# Patient Record
Sex: MALE | Race: WHITE | NOT HISPANIC OR LATINO | Employment: UNEMPLOYED | URBAN - METROPOLITAN AREA
[De-identification: names, ages, dates, MRNs, and addresses within clinical notes are randomized per-mention and may not be internally consistent; named-entity substitution may affect disease eponyms.]

---

## 2018-07-11 ENCOUNTER — AMBULATORY - HEALTHEAST (OUTPATIENT)
Dept: PULMONOLOGY | Facility: OTHER | Age: 43
End: 2018-07-11

## 2018-07-11 DIAGNOSIS — J18.9 ATYPICAL PNEUMONIA: ICD-10-CM

## 2018-08-24 ENCOUNTER — COMMUNICATION - HEALTHEAST (OUTPATIENT)
Dept: TELEHEALTH | Facility: CLINIC | Age: 43
End: 2018-08-24

## 2018-08-24 ENCOUNTER — OFFICE VISIT - HEALTHEAST (OUTPATIENT)
Dept: PULMONOLOGY | Facility: OTHER | Age: 43
End: 2018-08-24

## 2018-08-24 ENCOUNTER — HOSPITAL ENCOUNTER (OUTPATIENT)
Dept: CT IMAGING | Facility: CLINIC | Age: 43
Discharge: HOME | End: 2018-08-24
Attending: INTERNAL MEDICINE

## 2018-08-24 DIAGNOSIS — R06.02 SOB (SHORTNESS OF BREATH): ICD-10-CM

## 2018-08-24 DIAGNOSIS — J18.9 ATYPICAL PNEUMONIA: ICD-10-CM

## 2018-08-24 DIAGNOSIS — Z72.0 TOBACCO ABUSE: ICD-10-CM

## 2018-08-24 DIAGNOSIS — J18.9 BILATERAL PNEUMONIA: ICD-10-CM

## 2018-08-24 DIAGNOSIS — R91.8 PULMONARY NODULES: ICD-10-CM

## 2018-10-29 ENCOUNTER — COMMUNICATION - HEALTHEAST (OUTPATIENT)
Dept: PULMONOLOGY | Facility: OTHER | Age: 43
End: 2018-10-29

## 2018-10-29 DIAGNOSIS — R05.9 COUGH: ICD-10-CM

## 2018-10-29 DIAGNOSIS — Z72.0 TOBACCO ABUSE: ICD-10-CM

## 2018-10-29 RX ORDER — VARENICLINE TARTRATE 1 MG/1
TABLET, FILM COATED ORAL
Qty: 60 TABLET | Refills: 2 | Status: SHIPPED | OUTPATIENT
Start: 2018-10-29

## 2019-11-04 ENCOUNTER — AMBULATORY - HEALTHEAST (OUTPATIENT)
Dept: PULMONOLOGY | Facility: OTHER | Age: 44
End: 2019-11-04

## 2019-11-04 DIAGNOSIS — R05.9 COUGH: ICD-10-CM

## 2019-11-04 RX ORDER — ALBUTEROL SULFATE 90 UG/1
2 AEROSOL, METERED RESPIRATORY (INHALATION) EVERY 6 HOURS PRN
Qty: 1 INHALER | Refills: 0 | Status: SHIPPED | OUTPATIENT
Start: 2019-11-04

## 2020-01-02 ENCOUNTER — RECORDS - HEALTHEAST (OUTPATIENT)
Dept: ADMINISTRATIVE | Facility: OTHER | Age: 45
End: 2020-01-02

## 2021-05-28 ENCOUNTER — RECORDS - HEALTHEAST (OUTPATIENT)
Dept: ADMINISTRATIVE | Facility: CLINIC | Age: 46
End: 2021-05-28

## 2021-06-01 VITALS — BODY MASS INDEX: 20.92 KG/M2 | WEIGHT: 150 LBS

## 2021-06-04 VITALS — BODY MASS INDEX: 23.71 KG/M2 | WEIGHT: 170 LBS

## 2021-06-16 PROBLEM — J18.9 ATYPICAL PNEUMONIA: Status: ACTIVE | Noted: 2018-07-02

## 2021-06-16 PROBLEM — J18.9 PNEUMONIA: Status: ACTIVE | Noted: 2018-07-01

## 2021-06-20 NOTE — PROGRESS NOTES
Pulmonary Follow Up Note  8/24/2018    Reason for Follow Up: Bilateral pneumonia.      Problem List:     Patient Active Problem List   Diagnosis     Colitis     Abdominal pain, unspecified abdominal location     Chronic pain syndrome     Gastroesophageal reflux disease without esophagitis     Ischemic colitis (H)     Pneumonia     Atypical pneumonia     Acute respiratory failure with hypoxemia (H)     Weight loss     History of colitis     Hyponatremia     Bronchiectasis with acute lower respiratory infection (H)       History:     Mr. Steven Pelaez is a 42 yo male with PMH significant for colitis, GERD, IBD, and chronic pain for which he takes methadone who I saw at  for an acute pneumonia.    - Initially was hypoxic but resolved within 24 hours  - Bilateral tree-in-bud opacities on CT Chest  - Leukocytosis  - Now resolved symptoms of cough, chest discomfort, and dyspnea  - CT chest shows resolved opacities  - notes he was working with damp wood and leaves the week before he was sick  - he is trying to quit smoking and using his SOs chantix  - Denies any times of passing out or black outs, and does not feel the methadone is too strong  - no evidence to suggest aspiration    Past Medical History:   Diagnosis Date     Chronic pain syndrome      Colitis      Colitis      GERD (gastroesophageal reflux disease)      IBD (inflammatory bowel disease)      Past Surgical History:   Procedure Laterality Date     KNEE SURGERY Left      SHOULDER SURGERY Right      SIGMOIDOSCOPY N/A 6/7/2016    Procedure: SIGMOIDOSCOPY with biopsy and polypectomy;  Surgeon: Jesus Stewart MD;  Location: [facility];  Service:      Social History     Social History     Marital status:      Spouse name: N/A     Number of children: N/A     Years of education: N/A     Occupational History     Not on file.     Social History Main Topics     Smoking status: Current Every Day Smoker     Packs/day: 1.00     Smokeless tobacco: Never Used       Comment: Wants to quit     Alcohol use No     Drug use: No     Sexual activity: Not on file     Other Topics Concern     Not on file     Social History Narrative     No family history of cardiopulmonary disease  No Known Allergies    Review of Systems - 10 point review of systems negative except what is mentioned in the HPI.      Medications:     Current Outpatient Prescriptions on File Prior to Visit   Medication Sig Dispense Refill     acetaminophen (TYLENOL) 325 MG tablet Take 650 mg by mouth every 6 (six) hours as needed for pain.       dextromethorphan-guaiFENesin (ROBITUSSIN-DM)  mg/5 mL liquid Take 5 mL by mouth every 4 (four) hours as needed.  0     ibuprofen (ADVIL,MOTRIN) 400 MG tablet Take 1 tablet (400 mg total) by mouth every 6 (six) hours as needed for pain (taken with food).  0     methadone (DOLOPHINE) 10 mg/mL solution Take 100 mg by mouth daily.       omeprazole (PRILOSEC) 20 MG capsule Take 20 mg by mouth daily before breakfast.       No current facility-administered medications on file prior to visit.        Exam/Data:   Vitals  Vitals:    08/24/18 1522   BP: 110/54   Pulse: 86   Resp: 18   SpO2: 97%       EXAM:  GEN: Alert and oriented x3, NAD, does appear mildly somnolent but alert and attentive during visit  HEENT: Nares patent, posterior oropharynx clear.  RESPIRATORY: CTAB.  No wheeze or rales  CARDIOVASCULAR: RRR, no m/r/g  GASTROINTESTINAL: Round, soft, NT/ND  HEM/ONC: no adenopathy, no ecchymosis  MSK: no clubbing.  Ambulates normally  NEURO: cranial nerves appear grossly intact, muscle strength equal  SKIN: no rash or ulcerations      DATA    IMAGING:   CT chest reviewed personally and with patient.  There is complete resolution with the exception of mild GGO in the superior segment of the RLL on CT.  No significant adenopathy.  Noted small pulmonary nodules.    CT CHEST WO CONTRAST  8/24/2018 1:34 PM     INDICATION: Bronchiectasis cough atypical pneumonia with  bronchiectasis.  TECHNIQUE: Routine chest. Dose reduction techniques were used.  IV CONTRAST: None.  COMPARISON: 7/2/2018.     FINDINGS:  LUNGS AND PLEURA: Interval resolution of scattered centrilobular and tree-in-bud nodules bilaterally. Essentially resolved prior airway thickening as well. Few stable small pulmonary nodules, including 5 mm left lower lobe (series 2, image 63) and 3 mm   anterior right lower lobe (image 56). Negative pleural spaces.     MEDIASTINUM: No adenopathy. Nonaneurysmal aorta.     LIMITED UPPER ABDOMEN: Negative.     MUSCULOSKELETAL: Subacute appearing, nondisplaced anterior-lateral right third rib fracture with callus. Old nondisplaced lateral left third and fourth nondisplaced rib deformities.     IMPRESSION:   CONCLUSION:     1.  Resolved prior infectious/inflammatory abnormality in the lungs. Improved airway thickening without significant bronchiectasis on the current exam. No new findings.     2.  Stable small pulmonary nodules which may be incidental. Follow-up guidelines below.       Assessment/Plan:   Steven Pelaez is a 43 y.o. male with recent hospitalization for CAP and hypoxic respiratory failure, now resolved, with history of methadone use, and now with small nodules that are persistent.    1. CAP with hypoxia:  This is resolved.  No further work up needed.  He is weaning his methadone.  Doesn't seem to be any evidence of aspiration.    2. Pulmonary Nodules:  Given his smoking history, I will follow these as higher risk with a repeat CT in 1 year.  Could also represent IBD related nodules.    3. Tobacco abuse:  He is trying to quit.  I did  him for > 5 minutes.  Smoking cessation packet given.  Rx for Chantix and NRT given.    Recommend:  Follow up in 1 year with CT chest      Hector Zelaya DO

## 2021-06-25 ENCOUNTER — HOSPITAL ENCOUNTER (EMERGENCY)
Dept: EMERGENCY MEDICINE | Facility: CLINIC | Age: 46
Discharge: HOME | End: 2021-06-26
Attending: EMERGENCY MEDICINE
Payer: COMMERCIAL

## 2021-06-25 DIAGNOSIS — K22.6 MALLORY-WEISS TEAR: ICD-10-CM

## 2021-06-25 DIAGNOSIS — K92.0 HEMATEMESIS WITH NAUSEA: ICD-10-CM

## 2021-06-25 ASSESSMENT — MIFFLIN-ST. JEOR: SCORE: 1746.28

## 2021-06-26 LAB
ALBUMIN SERPL-MCNC: 4.3 G/DL (ref 3.5–5)
ALP SERPL-CCNC: 86 U/L (ref 45–120)
ALT SERPL W P-5'-P-CCNC: 23 U/L (ref 0–45)
ANION GAP SERPL CALCULATED.3IONS-SCNC: 8 MMOL/L (ref 5–18)
APTT PPP: 31 SECONDS (ref 24–37)
AST SERPL W P-5'-P-CCNC: 21 U/L (ref 0–40)
BASOPHILS # BLD AUTO: 0 THOU/UL (ref 0–0.2)
BASOPHILS NFR BLD AUTO: 0 % (ref 0–2)
BILIRUB SERPL-MCNC: 0.5 MG/DL (ref 0–1)
BUN SERPL-MCNC: 14 MG/DL (ref 8–22)
CALCIUM SERPL-MCNC: 9.3 MG/DL (ref 8.5–10.5)
CHLORIDE BLD-SCNC: 100 MMOL/L (ref 98–107)
CO2 SERPL-SCNC: 29 MMOL/L (ref 22–31)
CREAT SERPL-MCNC: 0.81 MG/DL (ref 0.7–1.3)
EOSINOPHIL # BLD AUTO: 0.2 THOU/UL (ref 0–0.4)
EOSINOPHIL NFR BLD AUTO: 1 % (ref 0–6)
ERYTHROCYTE [DISTWIDTH] IN BLOOD BY AUTOMATED COUNT: 13.5 % (ref 11–14.5)
GFR SERPL CREATININE-BSD FRML MDRD: >60 ML/MIN/1.73M2
GLUCOSE BLD-MCNC: 116 MG/DL (ref 70–125)
HCT VFR BLD AUTO: 43.2 % (ref 40–54)
HGB BLD-MCNC: 14.4 G/DL (ref 14–18)
IMM GRANULOCYTES # BLD: 0.1 THOU/UL
IMM GRANULOCYTES NFR BLD: 1 %
INR PPP: 1.05 (ref 0.9–1.1)
LACTATE SERPL-SCNC: 1.5 MMOL/L (ref 0.7–2)
LIPASE SERPL-CCNC: 31 U/L (ref 0–52)
LYMPHOCYTES # BLD AUTO: 3.4 THOU/UL (ref 0.8–4.4)
LYMPHOCYTES NFR BLD AUTO: 25 % (ref 20–40)
MCH RBC QN AUTO: 29.6 PG (ref 27–34)
MCHC RBC AUTO-ENTMCNC: 33.3 G/DL (ref 32–36)
MCV RBC AUTO: 89 FL (ref 80–100)
MONOCYTES # BLD AUTO: 0.9 THOU/UL (ref 0–0.9)
MONOCYTES NFR BLD AUTO: 7 % (ref 2–10)
NEUTROPHILS # BLD AUTO: 9 THOU/UL (ref 2–7.7)
NEUTROPHILS NFR BLD AUTO: 66 % (ref 50–70)
PLATELET # BLD AUTO: 331 THOU/UL (ref 140–440)
PMV BLD AUTO: 10.3 FL (ref 8.5–12.5)
POTASSIUM BLD-SCNC: 3.8 MMOL/L (ref 3.5–5)
PROT SERPL-MCNC: 7.6 G/DL (ref 6–8)
RBC # BLD AUTO: 4.86 MILL/UL (ref 4.4–6.2)
SODIUM SERPL-SCNC: 137 MMOL/L (ref 136–145)
WBC: 13.7 THOU/UL (ref 4–11)

## 2021-07-06 VITALS — HEIGHT: 71 IN | BODY MASS INDEX: 25.9 KG/M2 | WEIGHT: 185 LBS

## 2021-07-07 NOTE — ED PROVIDER NOTES
EMERGENCY DEPARTMENT ENCOUNTER      NAME: Steven Pelaez  AGE: 46 y.o. male  YOB: 1975  MRN: [id number]  EVALUATION DATE & TIME: 6/25/2021 11:34 PM    PCP: Eusebia Sharpe MD    ED PROVIDER: Javid Quiñonez M.D.      Chief Complaint   Patient presents with     Hematemesis     Abdominal Pain         FINAL IMPRESSION:  1. Lelia-Soto tear    2. Hematemesis with nausea          ED COURSE & MEDICAL DECISION MAKING:    Pertinent Labs & Imaging studies reviewed. (See chart for details)  PPE: surgical mask, eye protection  11:45 PM I met with the patient for the initial interview and physical examination. Discussed plan for treatment and workup in the ED.     46 y.o. male presents to the Emergency Department for evaluation of vomiting blood.  Patient has known history of Lelia-Soto tear.  I think this is again the cause.  Did do a CT scan.  There is no signs of esophageal perforation or Boerhaave syndrome.  White count mildly elevated.  Hemoglobin is normal.  Labs unremarkable including coags and LFTs.  Lipase is normal.  Lactic acid is normal.  Given IV fluids and IV Protonix.  Patient feeling better.  Also received IV droperidol with improvement of his vomiting.  Will discharge home with attenuation of his omeprazole.  Will follow up with GI.  Will schedule appointment as soon as possible.  Return immediately for any worsening symptoms.  At this time I do not think he has significant GI bleeding.  I do not think he needs to be admitted for repeat EGD.  Patient does understand that if he has worsening vomiting bleeding or other concerns he needs to return immediately.  Patient vitally stable here.  Discharge home.    At the conclusion of the encounter I discussed the results of all of the tests and the disposition. The questions were answered. The patient or family acknowledged understanding and was agreeable with the care plan.         MEDICATIONS GIVEN IN THE EMERGENCY:  Medications   sodium  chloride 0.9% 1,000 mL (0 mL Intravenous Stopped 6/26/21 0132)   droperidoL injection 2.5 mg (INAPSINE) (2.5 mg Intravenous Given 6/26/21 0007)   HYDROmorphone injection 0.5 mg (DILAUDID) (0.5 mg Intravenous Given 6/26/21 0007)   pantoprazole 40 mg injection (40 mg Intravenous Given 6/26/21 0008)   iopamidol solution 100 mL (ISOVUE-370) (100 mL Intravenous Given 6/26/21 0046)       NEW PRESCRIPTIONS STARTED AT TODAY'S ER VISIT  Discharge Medication List as of 6/26/2021  1:34 AM      CONTINUE these medications which have NOT CHANGED    Details   acetaminophen (TYLENOL) 325 MG tablet Take 650 mg by mouth every 6 (six) hours as needed for pain., Until Discontinued, Historical Med      albuterol (PROAIR HFA;PROVENTIL HFA;VENTOLIN HFA) 90 mcg/actuation inhaler Inhale 2 puffs every 6 (six) hours as needed for wheezing or shortness of breath. OFFICE VISIT NEEDED FOR ANY FURTHER REFILLS, Starting Mon 11/4/2019, Normal      dicyclomine (BENTYL) 20 mg tablet Take 1 tablet (20 mg total) by mouth 2 (two) times a day as needed., Starting Tue 3/10/2020, Normal      ibuprofen (ADVIL,MOTRIN) 400 MG tablet Take 1 tablet (400 mg total) by mouth every 6 (six) hours as needed for pain (taken with food)., Starting 7/4/2018, Until Discontinued, No Print      methadone (DOLOPHINE) 10 mg/mL solution Take 90 mg by mouth daily. , Until Discontinued, Historical Med      nebulizers Misc Please dispense one machine and associated equipment, Print      nicotine polacrilex (NICORETTE) 4 MG gum Apply 1 each (4 mg total) to the mouth or throat as needed for smoking cessation., Starting 8/24/2018, Until Discontinued, OTC      omeprazole (PRILOSEC) 20 MG capsule Take 20 mg by mouth daily before breakfast., Until Discontinued, Historical Med      oxyCODONE (ROXICODONE) 5 MG immediate release tablet Take 1 tablet (5 mg total) by mouth every 6 (six) hours as needed for pain., Starting Sun 1/5/2020, Print      varenicline (CHANTIX) 1 mg tablet Take 1 tab  by mouth two times a day. Take after eating with a full glass of water. NOTE: Dispense as maintenance for refills only., Normal                =================================================================    HPI    Patient information was obtained from: patient     Use of : N/A         Steven Pelaez is a 46 y.o. male with a pertinent history of HTN, hyperlipidemia, GERD, ulcerative colitis, IBD who presents to this ED for evaluation of hematemesis.    The patient reports he had a procedure for a hiatal hernia and esophageal tear on 6/18 (7 days ago). Since then, he has not had issues with vomiting or abdominal pain but his morning he developed stabbing abdominal pain and vomiting with intermittent hematemesis that worsened tonight. He is also having bloody bowel movements but they are not diarrheal. The patient has been taking Zofran with no relief. He is on sucrate and omeprazole but did not retain them today. He does not have an appointment scheduled with GI soon, but when he called them today they called back and told him to come to the ED. No fevers, chest pain, or other medical complaints.       REVIEW OF SYSTEMS   Review of Systems   Constitutional: Negative for fever.   Cardiovascular: Negative for chest pain.   Gastrointestinal: Positive for abdominal pain, blood in stool, nausea and vomiting (+ hematemesis). Negative for diarrhea.   All other systems reviewed and are negative.       PAST MEDICAL HISTORY:  Past Medical History:   Diagnosis Date     Chronic pain syndrome      Colitis      Colitis      GERD (gastroesophageal reflux disease)      IBD (inflammatory bowel disease)        PAST SURGICAL HISTORY:  Past Surgical History:   Procedure Laterality Date     KNEE SURGERY Left      SHOULDER SURGERY Right      SIGMOIDOSCOPY N/A 6/7/2016    Procedure: SIGMOIDOSCOPY with biopsy and polypectomy;  Surgeon: Jesus Stewart MD;  Location: [facility];  Service:            CURRENT MEDICATIONS:     No current facility-administered medications on file prior to encounter.      Current Outpatient Medications on File Prior to Encounter   Medication Sig     acetaminophen (TYLENOL) 325 MG tablet Take 650 mg by mouth every 6 (six) hours as needed for pain.     albuterol (PROAIR HFA;PROVENTIL HFA;VENTOLIN HFA) 90 mcg/actuation inhaler Inhale 2 puffs every 6 (six) hours as needed for wheezing or shortness of breath. OFFICE VISIT NEEDED FOR ANY FURTHER REFILLS     dicyclomine (BENTYL) 20 mg tablet Take 1 tablet (20 mg total) by mouth 2 (two) times a day as needed.     ibuprofen (ADVIL,MOTRIN) 400 MG tablet Take 1 tablet (400 mg total) by mouth every 6 (six) hours as needed for pain (taken with food).     methadone (DOLOPHINE) 10 mg/mL solution Take 90 mg by mouth daily.      nebulizers Misc Please dispense one machine and associated equipment     nicotine polacrilex (NICORETTE) 4 MG gum Apply 1 each (4 mg total) to the mouth or throat as needed for smoking cessation.     omeprazole (PRILOSEC) 20 MG capsule Take 20 mg by mouth daily before breakfast.     oxyCODONE (ROXICODONE) 5 MG immediate release tablet Take 1 tablet (5 mg total) by mouth every 6 (six) hours as needed for pain.     varenicline (CHANTIX) 1 mg tablet Take 1 tab by mouth two times a day. Take after eating with a full glass of water. NOTE: Dispense as maintenance for refills only.       ALLERGIES:  No Known Allergies    FAMILY HISTORY:  No family history on file.    SOCIAL HISTORY:   Social History     Socioeconomic History     Marital status: Single     Spouse name: Not on file     Number of children: Not on file     Years of education: Not on file     Highest education level: Not on file   Occupational History     Not on file   Social Needs     Financial resource strain: Not on file     Food insecurity     Worry: Not on file     Inability: Not on file     Transportation needs     Medical: Not on file     Non-medical: Not on file   Tobacco Use      "Smoking status: Current Every Day Smoker     Packs/day: 1.00     Smokeless tobacco: Never Used     Tobacco comment: Wants to quit   Substance and Sexual Activity     Alcohol use: No     Drug use: No     Sexual activity: Not on file   Lifestyle     Physical activity     Days per week: Not on file     Minutes per session: Not on file     Stress: Not on file   Relationships     Social connections     Talks on phone: Not on file     Gets together: Not on file     Attends Evangelical service: Not on file     Active member of club or organization: Not on file     Attends meetings of clubs or organizations: Not on file     Relationship status: Not on file     Intimate partner violence     Fear of current or ex partner: Not on file     Emotionally abused: Not on file     Physically abused: Not on file     Forced sexual activity: Not on file   Other Topics Concern     Not on file   Social History Narrative     Not on file       VITALS:  Patient Vitals for the past 24 hrs:   BP Temp Temp src Pulse Resp SpO2 Height Weight   06/26/21 0129 -- -- -- 83 -- 98 % -- --   06/26/21 0115 -- -- -- 82 -- 96 % -- --   06/26/21 0100 -- -- -- 83 -- 96 % -- --   06/26/21 0051 -- -- -- 83 -- 98 % -- --   06/26/21 0038 -- -- -- 86 -- 95 % -- --   06/26/21 0030 -- -- -- 81 -- 92 % -- --   06/26/21 0016 -- -- -- 88 -- 94 % -- --   06/25/21 2331 118/85 97.9  F (36.6  C) Oral 89 20 95 % 5' 11\" (1.803 m) 185 lb (83.9 kg)       PHYSICAL EXAM    Constitutional:  Well developed, well nourished, no acute distress   EYES: Conjunctivae clear  HENT:  Atraumatic, normocephalic Bilateral external ears normal, nose normal, oropharynx moist. Neck- supple   Respiratory:  No respiratory distress, normal breath sounds, no rales, no wheezing, no cough, no stridor   Cardiovascular:  Normal rate, normal rhythm, no murmurs, capillary refill normal.  No chest wall tenderness  GI:  Soft, nondistended, nontender, no palpable masses, no rebound, no guarding   : No CVA " tenderness.    Musculoskeletal:  No edema.  No cyanosis.  Range of motion major extremities intact. No tenderness to palpation or major deformities noted.    Integument: Warm, Dry, No erythema, No rash.   Neurologic:  Alert & oriented, no focal deficits noted, ambulatory  Psych: Affect normal, Mood normal.       LAB:  All pertinent labs reviewed and interpreted.  Results for orders placed or performed during the hospital encounter of 06/25/21   Comprehensive Metabolic Panel   Result Value Ref Range    Sodium 137 136 - 145 mmol/L    Potassium 3.8 3.5 - 5.0 mmol/L    Chloride 100 98 - 107 mmol/L    CO2 29 22 - 31 mmol/L    Anion Gap, Calculation 8 5 - 18 mmol/L    Glucose 116 70 - 125 mg/dL    BUN 14 8 - 22 mg/dL    Creatinine 0.81 0.70 - 1.30 mg/dL    GFR MDRD Af Amer >60 >60 mL/min/1.73m2    GFR MDRD Non Af Amer >60 >60 mL/min/1.73m2    Bilirubin, Total 0.5 0.0 - 1.0 mg/dL    Calcium 9.3 8.5 - 10.5 mg/dL    Protein, Total 7.6 6.0 - 8.0 g/dL    Albumin 4.3 3.5 - 5.0 g/dL    Alkaline Phosphatase 86 45 - 120 U/L    AST 21 0 - 40 U/L    ALT 23 0 - 45 U/L   Lactic Acid   Result Value Ref Range    Lactic Acid 1.5 0.7 - 2.0 mmol/L   Lipase   Result Value Ref Range    Lipase 31 0 - 52 U/L   INR   Result Value Ref Range    INR 1.05 0.90 - 1.10   APTT(PTT)   Result Value Ref Range    PTT 31 24 - 37 seconds   HM1 (CBC with Diff)   Result Value Ref Range    WBC 13.7 (H) 4.0 - 11.0 thou/uL    RBC 4.86 4.40 - 6.20 mill/uL    Hemoglobin 14.4 14.0 - 18.0 g/dL    Hematocrit 43.2 40.0 - 54.0 %    MCV 89 80 - 100 fL    MCH 29.6 27.0 - 34.0 pg    MCHC 33.3 32.0 - 36.0 g/dL    RDW 13.5 11.0 - 14.5 %    Platelets 331 140 - 440 thou/uL    MPV 10.3 8.5 - 12.5 fL    Neutrophils % 66 50 - 70 %    Lymphocytes % 25 20 - 40 %    Monocytes % 7 2 - 10 %    Eosinophils % 1 0 - 6 %    Basophils % 0 0 - 2 %    Immature Granulocyte % 1 (H) <=0 %    Neutrophils Absolute 9.0 (H) 2.0 - 7.7 thou/uL    Lymphocytes Absolute 3.4 0.8 - 4.4 thou/uL     Monocytes Absolute 0.9 0.0 - 0.9 thou/uL    Eosinophils Absolute 0.2 0.0 - 0.4 thou/uL    Basophils Absolute 0.0 0.0 - 0.2 thou/uL    Immature Granulocyte Absolute 0.1 (H) <=0.0 thou/uL       RADIOLOGY:  Reviewed all pertinent imaging. Please see official radiology report.  Ct Abdomen Pelvis Without Oral With Iv Contrast    Result Date: 6/26/2021  EXAM: CT ABDOMEN PELVIS WO ORAL W IV CONTRAST LOCATION: [facility] DATE/TIME: 6/26/2021 12:54 AM INDICATION: Abdominal pain, acute, mid to upper abd pain, vomiting blood.  H/O Lelia-Soto tear and hiatal hernia.  Recent EGD. COMPARISON: 09/15/2013, chest 09/05/2018, lumbar spine 07/02/2018 TECHNIQUE: CT scan of the abdomen and pelvis was performed following injection of IV contrast. Multiplanar reformats were obtained. Dose reduction techniques were used. CONTRAST: Iopamidol (Isovue-370) 100mL FINDINGS: LOWER CHEST: Mild air trapping often associated with small vessel or small airways disease. Mild fibroatelectasis without focal infiltrate or effusion. HEPATOBILIARY: Normal. PANCREAS: Normal. SPLEEN: Normal. ADRENAL GLANDS: Normal. KIDNEYS/BLADDER: Diffuse mild thickening of the urinary bladder could be artifact from incomplete distention. BOWEL: Normal appendix. No bowel obstruction or acute inflammatory process involving the GI tract. LYMPH NODES: Normal. VASCULATURE: Unremarkable. PELVIC ORGANS: Normal. MUSCULOSKELETAL: Small fat-containing paraumbilical hernia. Degenerative disease. Old compression fractures L1 and L5 vertebral bodies.     1.  Mild air trapping often associated with small vessel or small airways disease. No focal infiltrate or effusion. 2.  Small fat-containing paraumbilical hernia without bowel obstruction. 3.  Mild thickening of the urinary bladder could be artifact from incomplete distention, correlate with urinalysis. 4.  No finding to account for patient's symptoms.        Meseret BEARD, am serving as a scribe to  document services personally performed by Dr. Javid Quiñonez based on my observation and the provider's statements to me. I, Javid Quiñonez MD attest that Meseret Cleaning is acting in a scribe capacity, has observed my performance of the services and has documented them in accordance with my direction.    Javid Quiñonez M.D.  Emergency Medicine  [facility] EMERGENCY ROOM  [address] [address]  Dept: [phone]  Loc: [phone]     Javid Quiñonez MD  06/26/21 0512

## 2021-07-07 NOTE — ED TRIAGE NOTES
Pt to the ED with family with a c/o mid abd pain worsening for the last 6 hours and vomiting blood since about 0700. Pt had procedure for hiatal hernia and tear in his esophagus a week ago Thursday with [facility]. Pt has hx of chrone. Pt is alert, orient and appropriate. His skin is warm and dry. He moves well on his own power.

## 2021-11-14 ENCOUNTER — APPOINTMENT (OUTPATIENT)
Dept: RADIOLOGY | Facility: CLINIC | Age: 46
End: 2021-11-14
Attending: EMERGENCY MEDICINE
Payer: COMMERCIAL

## 2021-11-14 ENCOUNTER — HOSPITAL ENCOUNTER (EMERGENCY)
Facility: CLINIC | Age: 46
Discharge: HOME | End: 2021-11-15
Attending: EMERGENCY MEDICINE | Admitting: EMERGENCY MEDICINE
Payer: COMMERCIAL

## 2021-11-14 VITALS
BODY MASS INDEX: 27.2 KG/M2 | OXYGEN SATURATION: 97 % | TEMPERATURE: 97.7 F | HEART RATE: 95 BPM | HEIGHT: 70 IN | SYSTOLIC BLOOD PRESSURE: 139 MMHG | DIASTOLIC BLOOD PRESSURE: 90 MMHG | RESPIRATION RATE: 18 BRPM | WEIGHT: 190 LBS

## 2021-11-14 DIAGNOSIS — J40 BRONCHITIS: ICD-10-CM

## 2021-11-14 PROCEDURE — C9803 HOPD COVID-19 SPEC COLLECT: HCPCS

## 2021-11-14 PROCEDURE — 99284 EMERGENCY DEPT VISIT MOD MDM: CPT | Mod: 25

## 2021-11-14 PROCEDURE — 94640 AIRWAY INHALATION TREATMENT: CPT

## 2021-11-14 PROCEDURE — 71045 X-RAY EXAM CHEST 1 VIEW: CPT

## 2021-11-14 PROCEDURE — 250N000012 HC RX MED GY IP 250 OP 636 PS 637: Performed by: EMERGENCY MEDICINE

## 2021-11-14 PROCEDURE — 250N000009 HC RX 250: Performed by: EMERGENCY MEDICINE

## 2021-11-14 PROCEDURE — 87636 SARSCOV2 & INF A&B AMP PRB: CPT | Performed by: EMERGENCY MEDICINE

## 2021-11-14 RX ORDER — PREDNISONE 20 MG/1
TABLET ORAL
Qty: 8 TABLET | Refills: 0 | Status: SHIPPED | OUTPATIENT
Start: 2021-11-15 | End: 2021-11-18

## 2021-11-14 RX ORDER — AZITHROMYCIN 250 MG/1
TABLET, FILM COATED ORAL
Qty: 6 TABLET | Refills: 0 | Status: SHIPPED | OUTPATIENT
Start: 2021-11-14 | End: 2021-11-19

## 2021-11-14 RX ORDER — PREDNISONE 20 MG/1
40 TABLET ORAL ONCE
Status: COMPLETED | OUTPATIENT
Start: 2021-11-14 | End: 2021-11-14

## 2021-11-14 RX ORDER — BENZONATATE 200 MG/1
200 CAPSULE ORAL 3 TIMES DAILY PRN
Qty: 21 CAPSULE | Refills: 0 | Status: SHIPPED | OUTPATIENT
Start: 2021-11-14

## 2021-11-14 RX ORDER — IPRATROPIUM BROMIDE AND ALBUTEROL SULFATE 2.5; .5 MG/3ML; MG/3ML
3 SOLUTION RESPIRATORY (INHALATION) ONCE
Status: COMPLETED | OUTPATIENT
Start: 2021-11-14 | End: 2021-11-14

## 2021-11-14 RX ORDER — ALBUTEROL SULFATE 90 UG/1
2 AEROSOL, METERED RESPIRATORY (INHALATION) EVERY 6 HOURS PRN
Qty: 18 G | Refills: 0 | Status: SHIPPED | OUTPATIENT
Start: 2021-11-14

## 2021-11-14 RX ADMIN — IPRATROPIUM BROMIDE AND ALBUTEROL SULFATE 3 ML: .5; 2.5 SOLUTION RESPIRATORY (INHALATION) at 23:55

## 2021-11-14 RX ADMIN — PREDNISONE 40 MG: 20 TABLET ORAL at 23:02

## 2021-11-14 ASSESSMENT — MIFFLIN-ST. JEOR: SCORE: 1753.08

## 2021-11-14 ASSESSMENT — ENCOUNTER SYMPTOMS
CHILLS: 1
FEVER: 0
NAUSEA: 1
MYALGIAS: 1
DIARRHEA: 1
COUGH: 1

## 2021-11-15 LAB
FLUAV RNA SPEC QL NAA+PROBE: NEGATIVE
FLUBV RNA RESP QL NAA+PROBE: NEGATIVE
SARS-COV-2 RNA RESP QL NAA+PROBE: NEGATIVE

## 2021-11-15 NOTE — ED TRIAGE NOTES
Productive Cough, tired, body aches for 2-3 days  Had same symptoms a few weeks ago that was treated and cleared with amoxicillin but symptoms came back.  Sats 98% on room air, no respiratory distress in triage.  Has not been tested for COVID.

## 2021-11-15 NOTE — ED PROVIDER NOTES
EMERGENCY DEPARTMENT ENCOUNTER      NAME: Steven Pelaez  AGE: 46 year old male  YOB: 1975  MRN: [id number]  EVALUATION DATE & TIME: 11/14/2021 10:31 PM    PCP: Eusebia Sharpe    ED PROVIDER: Kristin Field M.D.      Chief Complaint   Patient presents with     Cough         FINAL IMPRESSION:  1. Bronchitis        MEDICAL DECISION MAKING:    Pertinent Labs & Imaging studies reviewed. (See chart for details)  ED Course as of 11/15/21 0022   Sun Nov 14, 2021   2251 Afebrile.  Vital signs here unremarkable.  Saturating 97% on room air without difficulty.  Patient is coming in with just worsening cough.   2256 Has been sick with sinus infection over the last several weeks, worsening symptoms over the last several days.  He is fully vaccinated.  Primarily concerned about the cough that he is having.  He is a current every day smoker.  Reports cough productive of yellow-green sputum.  Some shortness of breath associated with it.  He states he has no history of asthma but he has a nebulizer machine at home but he has not been using it.  Has not yet had the flu vaccine.   2256 Physical exam for patient here without any acute cardiopulmonary distress.  Lung sounds coarse bilaterally in all lung fields with crackles appreciated at bilateral bases.  He has wet sounding cough.  Posterior oropharynx is mildly erythematous.  He does have some cervical lymphadenopathy.  Otherwise his exam is unremarkable.Could be consistent with possible pneumonia versus a viral issue.  Also could be bronchitis given his history of smoking.  We will give him a DuoNeb here as he does sound kind of coarse and he does have some slight wheezing at the apices.  We will give him some dose of steroids, swab him for both Covid and influenza, chest x-ray.         Patient's breathing is better after his DuoNeb here.  Will discharge with medications, concern here is for bronchitis with wheezing.  Will start antibiotics, will call if  influenza or Covid is positive.      Critical care: 0 minutes excluding separately billable procedures.  Includes bedside management, time reviewing test results, review of records, discussing the case with staff, documenting the medical record and time spent with family members (or surrogate decision makers) discussing specific treatment issues.          ED COURSE:      10:36 PM I met with the patient for the initial interview and physical examination. Discussed plan for treatment and workup in the ED.    11:56 PM I rechecked and updated the patient about his labs and imaging.  12:10 AM I discussed the plan for discharge with the patient, and patient is agreeable. We discussed supportive cares at home and reasons for return to the ER including new or worsening symptoms - all questions and concerns addressed. Patient to be discharged by RN.   The importance of close follow up was discussed. We reviewed warning signs and symptoms, and I instructed Mr. Pelaez to return to the emergency department immediately if he develops any new or worsening symptoms. I provided additional verbal discharge instructions. Mr. Pelaez expressed understanding and agreement with this plan of care, his questions were answered, and he was discharged in stable condition.     MEDICATIONS GIVEN IN THE EMERGENCY:  Medications   ipratropium - albuterol 0.5 mg/2.5 mg/3 mL (DUONEB) neb solution 3 mL (3 mLs Nebulization Given 11/14/21 1765)   predniSONE (DELTASONE) tablet 40 mg (40 mg Oral Given 11/14/21 2302)       NEW PRESCRIPTIONS STARTED AT TODAY'S ER VISIT:  New Prescriptions    ALBUTEROL (PROAIR HFA/PROVENTIL HFA/VENTOLIN HFA) 108 (90 BASE) MCG/ACT INHALER    Inhale 2 puffs into the lungs every 6 hours as needed for shortness of breath / dyspnea or wheezing    AZITHROMYCIN (ZITHROMAX Z-TARIK) 250 MG TABLET    Two tablets on the first day, then one tablet daily for the next 4 days    BENZONATATE (TESSALON) 200 MG CAPSULE    Take 1 capsule (200  mg) by mouth 3 times daily as needed for cough    PREDNISONE (DELTASONE) 20 MG TABLET    Take two tablets (= 40mg) each day for 5 (five) days          =================================================================    HPI    Patient information was obtained from: The patient    Use of : N/A       Steven Pelaez is a 46 year old male with a pertinent medical history of colitis, and hypertension who presents to the ED via walk-in for evaluation of cough.    The patient reports that for the past 2-3 weeks he has been sick with symptoms that he describes as similar to a sinus infection. This includes body aches, chills, nausea, diarrhea, and a productive cough (with yellow mucus) which all have been progressively worsening over the last 3 days. The patient is fully vaccinated for COVID-19 with Pfizer in April and May of 2021. The patient has a history of Chron's Disease but does not take immunosuppressants. The patient denies lost of taste and smell, fever, and any other symptoms or complaints at this time.      ScHx: the patient admits to smoking tobacco.      REVIEW OF SYSTEMS   Review of Systems   Constitutional: Positive for chills. Negative for fever.   HENT:        Negative for lost of taste and smell    Respiratory: Positive for cough (productive).    Gastrointestinal: Positive for diarrhea and nausea.   Musculoskeletal: Positive for myalgias (body aches).   All other systems reviewed and are negative.        PAST MEDICAL HISTORY:  Past Medical History:   Diagnosis Date     Chronic pain syndrome      Colitis      Colitis      GERD (gastroesophageal reflux disease)      IBD (inflammatory bowel disease)        PAST SURGICAL HISTORY:  Past Surgical History:   Procedure Laterality Date     KNEE SURGERY Left      SHOULDER SURGERY Right      SIGMOIDOSCOPY FLEXIBLE N/A 6/7/2016    Procedure: SIGMOIDOSCOPY with biopsy and polypectomy;  Surgeon: Jesus Stewart MD;  Location: [facility];  Service:         CURRENT MEDICATIONS:    No current facility-administered medications for this encounter.    Current Outpatient Medications:      albuterol (PROAIR HFA/PROVENTIL HFA/VENTOLIN HFA) 108 (90 Base) MCG/ACT inhaler, Inhale 2 puffs into the lungs every 6 hours as needed for shortness of breath / dyspnea or wheezing, Disp: 18 g, Rfl: 0     azithromycin (ZITHROMAX Z-TARIK) 250 MG tablet, Two tablets on the first day, then one tablet daily for the next 4 days, Disp: 6 tablet, Rfl: 0     benzonatate (TESSALON) 200 MG capsule, Take 1 capsule (200 mg) by mouth 3 times daily as needed for cough, Disp: 21 capsule, Rfl: 0     predniSONE (DELTASONE) 20 MG tablet, Take two tablets (= 40mg) each day for 5 (five) days, Disp: 8 tablet, Rfl: 0     acetaminophen (TYLENOL) 325 MG tablet, [ACETAMINOPHEN (TYLENOL) 325 MG TABLET] Take 650 mg by mouth every 6 (six) hours as needed for pain., Disp: , Rfl:      albuterol (PROAIR HFA;PROVENTIL HFA;VENTOLIN HFA) 90 mcg/actuation inhaler, [ALBUTEROL (PROAIR HFA;PROVENTIL HFA;VENTOLIN HFA) 90 MCG/ACTUATION INHALER] Inhale 2 puffs every 6 (six) hours as needed for wheezing or shortness of breath. OFFICE VISIT NEEDED FOR ANY FURTHER REFILLS, Disp: 1 Inhaler, Rfl: 0     dicyclomine (BENTYL) 20 mg tablet, [DICYCLOMINE (BENTYL) 20 MG TABLET] Take 1 tablet (20 mg total) by mouth 2 (two) times a day as needed., Disp: 20 tablet, Rfl: 0     ibuprofen (ADVIL,MOTRIN) 400 MG tablet, [IBUPROFEN (ADVIL,MOTRIN) 400 MG TABLET] Take 1 tablet (400 mg total) by mouth every 6 (six) hours as needed for pain (taken with food)., Disp: , Rfl: 0     methadone (DOLOPHINE) 10 mg/mL solution, [METHADONE (DOLOPHINE) 10 MG/ML SOLUTION] Take 90 mg by mouth daily. , Disp: , Rfl:      nebulizers Misc, [NEBULIZERS MISC] Please dispense one machine and associated equipment, Disp: 1 each, Rfl: 0     nicotine polacrilex (NICORETTE) 4 MG gum, [NICOTINE POLACRILEX (NICORETTE) 4 MG GUM] Apply 1 each (4 mg total) to the mouth or throat  "as needed for smoking cessation., Disp: , Rfl: 0     omeprazole (PRILOSEC) 20 MG capsule, [OMEPRAZOLE (PRILOSEC) 20 MG CAPSULE] Take 20 mg by mouth daily before breakfast., Disp: , Rfl:      oxyCODONE (ROXICODONE) 5 MG immediate release tablet, [OXYCODONE (ROXICODONE) 5 MG IMMEDIATE RELEASE TABLET] Take 1 tablet (5 mg total) by mouth every 6 (six) hours as needed for pain., Disp: 12 tablet, Rfl: 0     varenicline (CHANTIX) 1 mg tablet, [VARENICLINE (CHANTIX) 1 MG TABLET] Take 1 tab by mouth two times a day. Take after eating with a full glass of water. NOTE: Dispense as maintenance for refills only., Disp: 60 tablet, Rfl: 2    ALLERGIES:  No Known Allergies    FAMILY HISTORY:  History reviewed. No pertinent family history.    SOCIAL HISTORY:   Social History     Socioeconomic History     Marital status: Single     Spouse name: Not on file     Number of children: Not on file     Years of education: Not on file     Highest education level: Not on file   Occupational History     Not on file   Tobacco Use     Smoking status: Current Every Day Smoker     Packs/day: 1.00     Smokeless tobacco: Never Used     Tobacco comment: Wants to quit   Substance and Sexual Activity     Alcohol use: No     Drug use: No     Sexual activity: Not on file   Other Topics Concern     Not on file   Social History Narrative     Not on file     Social Determinants of Health     Financial Resource Strain: Not on file   Food Insecurity: Not on file   Transportation Needs: Not on file   Physical Activity: Not on file   Stress: Not on file   Social Connections: Not on file   Intimate Partner Violence: Not on file   Housing Stability: Not on file       PHYSICAL EXAM:    Vitals: BP (!) 139/90   Pulse 95   Temp 97.7  F (36.5  C) (Oral)   Resp 18   Ht 1.778 m (5' 10\")   Wt 86.2 kg (190 lb)   SpO2 97%   BMI 27.26 kg/m     General:. Alert and interactive, comfortable appearing.  HENT: Oropharynx without exudates. MMM.  TMs clear bilaterally. " Posterior oropharynx is mildly erythematous.   Eyes: Pupils mid-sized and equally reactive.   Neck: Full AROM.  No midline tenderness to palpation. Some cervical lymphadenopathy noted.  Cardiovascular: Regular rate and rhythm. Peripheral pulses 2+ bilaterally. No acute cardiopulmonary distress.   Chest/Pulmonary: Normal work of breathing.  Lung sounds coarse bilaterally in all lung fields with crackles appreciated at bilateral bases.  He has wet sounding cough. No chest wall tenderness or deformities.  Abdomen: Soft, nondistended. Nontender without guarding or rebound.  Back/Spine: No CVA or midline tenderness.  Extremities: Normal ROM of all major joints. No lower extremity edema.   Skin: Warm and dry. Normal skin color.   Neuro: Speech clear. CNs grossly intact. Moves all extremities appropriately. Strength and sensation grossly intact to all extremities.   Psych: Normal affect/mood, cooperative, memory appropriate.     LAB:  All pertinent labs reviewed and interpreted.  Labs Ordered and Resulted from Time of ED Arrival to Time of ED Departure - No data to display    RADIOLOGY:  XR Chest Port 1 View   Final Result   IMPRESSION: No change. Heart size and pulmonary vessels are normal. Lungs are clear. Old healed left-sided rib fractures.            I, Basilio Klein, am serving as a scribe to document services personally performed by Dr. Kristin Field  based on my observation and the provider's statements to me. I, Kristin Field MD attest that Basilio Klein is acting in a scribe capacity, has observed my performance of the services and has documented them in accordance with my direction.      Kristin Field M.D.  Emergency Medicine  [facility] EMERGENCY ROOM  [address] [id number] [phone]  Dept: [phone]       Ana Cristina Field MD  11/15/21 0023

## 2024-08-26 ENCOUNTER — HOSPITAL ENCOUNTER (EMERGENCY)
Facility: CLINIC | Age: 49
Discharge: HOME | End: 2024-08-26
Attending: EMERGENCY MEDICINE | Admitting: EMERGENCY MEDICINE
Payer: COMMERCIAL

## 2024-08-26 ENCOUNTER — HOSPITAL ENCOUNTER (EMERGENCY)
Facility: CLINIC | Age: 49
Discharge: HOME | End: 2024-08-26

## 2024-08-26 VITALS
TEMPERATURE: 97.7 F | WEIGHT: 185 LBS | SYSTOLIC BLOOD PRESSURE: 135 MMHG | BODY MASS INDEX: 25.9 KG/M2 | RESPIRATION RATE: 20 BRPM | HEIGHT: 71 IN | DIASTOLIC BLOOD PRESSURE: 88 MMHG | HEART RATE: 111 BPM | OXYGEN SATURATION: 98 %

## 2024-08-26 DIAGNOSIS — K04.7 DENTAL ABSCESS: ICD-10-CM

## 2024-08-26 PROCEDURE — 41800 DRAINAGE OF GUM LESION: CPT

## 2024-08-26 PROCEDURE — 99283 EMERGENCY DEPT VISIT LOW MDM: CPT | Mod: 25

## 2024-08-26 RX ORDER — PENICILLIN V POTASSIUM 500 MG/1
500 TABLET, FILM COATED ORAL 2 TIMES DAILY
Qty: 14 TABLET | Refills: 0 | Status: SHIPPED | OUTPATIENT
Start: 2024-08-26 | End: 2024-09-02

## 2024-08-26 ASSESSMENT — COLUMBIA-SUICIDE SEVERITY RATING SCALE - C-SSRS
2. HAVE YOU ACTUALLY HAD ANY THOUGHTS OF KILLING YOURSELF IN THE PAST MONTH?: NO
6. HAVE YOU EVER DONE ANYTHING, STARTED TO DO ANYTHING, OR PREPARED TO DO ANYTHING TO END YOUR LIFE?: NO
1. IN THE PAST MONTH, HAVE YOU WISHED YOU WERE DEAD OR WISHED YOU COULD GO TO SLEEP AND NOT WAKE UP?: NO

## 2024-08-26 NOTE — DISCHARGE INSTRUCTIONS
Many of these clinics offer a sliding fee option for patients that qualify, and see patients on a walk-in or same day basis. Please call each clinic directly. As services, hours, fees and policies vary greatly.    [place]:  [facility]     [phone]  [facility] ([facility]) [phone]  [facility]  [phone]  [facility]      [phone]   [facility]    [phone]  [facility]  [phone]  [facility] [phone]  [facility]     [phone]  [facility]   [phone]  [facility] (cash only)   [phone]  [facility]    [phone] (adults)          [phone] (children)    [place]:  [facility]     [phone]; [phone]  [facility]     [phone]  [facility]     [phone]  [facility] (free, limited)    [phone]    Multiple Locations:  [facility]       [phone]

## 2024-08-26 NOTE — ED PROVIDER NOTES
EMERGENCY DEPARTMENT ENCOUNTER      NAME: Steven Pelaez  AGE: 49 year old male  YOB: 1975  MRN: [id number]  EVALUATION DATE & TIME: No admission date for patient encounter.    PCP: Eusebia Sharpe    ED PROVIDER: Javid Quiñonez M.D.      Chief Complaint   Patient presents with    Dental Pain         FINAL IMPRESSION:  1. Dental abscess          ED COURSE & MEDICAL DECISION MAKING:    Pertinent Labs & Imaging studies reviewed. (See chart for details)  49 year old male presents to the Emergency Department for evaluation of tooth pain and concern for abscess.  Patient had a week of tooth pain.  There is an abscess noted around tooth 14.  After discussion with the patient did do a needle aspiration get a small amount of pus out of that.  Continue to drain here.  Will put him on penicillin and will call his dentist this morning to get in as soon as possible.  Continue Tylenol ibuprofen.  Return for worsening symptoms.    5:29 AM I met with the patient to gather history and to perform my initial exam. I discussed the plan for care while in the Emergency Department.       At the conclusion of the encounter I discussed the results of all of the tests and the disposition. The questions were answered. The patient or family acknowledged understanding and was agreeable with the care plan.     Medical Decision Making  Obtained supplemental history:Supplemental history obtained?: No  Reviewed external records: External records reviewed?: Documented in chart and Outpatient Record: dated 2/29/24 for dental infection  Care impacted by chronic illness:N/A  Care significantly affected by social determinants of health:N/A  Did you consider but not order tests?: Work up considered but not performed and documented in chart, if applicable  Did you interpret images independently?: Independent interpretation of ECG and images noted in documentation, when applicable.  Consultation discussion with other provider:Did you  involve another provider (consultant, , pharmacy, etc.)?: No  Discharge. I prescribed additional prescription strength medication(s) as charted. See documentation for any additional details.  Dental Pain: Patient was seen for dental pain. No opiates were prescribed as recommended by ACEP to reduce patient harm related to the opioid crisis.           MEDICATIONS GIVEN IN THE EMERGENCY:  Medications - No data to display    NEW PRESCRIPTIONS STARTED AT TODAY'S ER VISIT  New Prescriptions    PENICILLIN V (VEETID) 500 MG TABLET    Take 1 tablet (500 mg) by mouth 2 times daily for 7 days.          =================================================================    HPI    Patient information was obtained from: Patient     Steven Pelaez is a 49 year old male with a pertinent history of htn and asthma who presents to this ED for evaluation of dental pain and concern for abscess.  Had a week of dental pain.  Getting worse.  Is in the left upper side of his jaw.  Has noticed worsening swelling there.  No fevers.  No difficulty swallowing.  Does smoke.  Is down to 2 cigarettes a day from 2 packs a day.  Has not seen his dentist as of yet.        PAST MEDICAL HISTORY:  Past Medical History:   Diagnosis Date    Chronic pain syndrome     Colitis     Colitis     GERD (gastroesophageal reflux disease)     IBD (inflammatory bowel disease)        PAST SURGICAL HISTORY:  Past Surgical History:   Procedure Laterality Date    KNEE SURGERY Left     SHOULDER SURGERY Right     SIGMOIDOSCOPY FLEXIBLE N/A 6/7/2016    Procedure: SIGMOIDOSCOPY with biopsy and polypectomy;  Surgeon: Jesus Stewart MD;  Location: [facility];  Service:            CURRENT MEDICATIONS:    No current facility-administered medications for this encounter.     Current Outpatient Medications   Medication Sig Dispense Refill    penicillin V (VEETID) 500 MG tablet Take 1 tablet (500 mg) by mouth 2 times daily for 7 days. 14 tablet 0    acetaminophen (TYLENOL) 325  MG tablet [ACETAMINOPHEN (TYLENOL) 325 MG TABLET] Take 650 mg by mouth every 6 (six) hours as needed for pain.      albuterol (PROAIR HFA/PROVENTIL HFA/VENTOLIN HFA) 108 (90 Base) MCG/ACT inhaler Inhale 2 puffs into the lungs every 6 hours as needed for shortness of breath / dyspnea or wheezing 18 g 0    albuterol (PROAIR HFA;PROVENTIL HFA;VENTOLIN HFA) 90 mcg/actuation inhaler [ALBUTEROL (PROAIR HFA;PROVENTIL HFA;VENTOLIN HFA) 90 MCG/ACTUATION INHALER] Inhale 2 puffs every 6 (six) hours as needed for wheezing or shortness of breath. OFFICE VISIT NEEDED FOR ANY FURTHER REFILLS 1 Inhaler 0    benzonatate (TESSALON) 200 MG capsule Take 1 capsule (200 mg) by mouth 3 times daily as needed for cough 21 capsule 0    dicyclomine (BENTYL) 20 mg tablet [DICYCLOMINE (BENTYL) 20 MG TABLET] Take 1 tablet (20 mg total) by mouth 2 (two) times a day as needed. 20 tablet 0    ibuprofen (ADVIL,MOTRIN) 400 MG tablet [IBUPROFEN (ADVIL,MOTRIN) 400 MG TABLET] Take 1 tablet (400 mg total) by mouth every 6 (six) hours as needed for pain (taken with food).  0    methadone (DOLOPHINE) 10 mg/mL solution [METHADONE (DOLOPHINE) 10 MG/ML SOLUTION] Take 90 mg by mouth daily.       nebulizers Misc [NEBULIZERS MISC] Please dispense one machine and associated equipment 1 each 0    nicotine polacrilex (NICORETTE) 4 MG gum [NICOTINE POLACRILEX (NICORETTE) 4 MG GUM] Apply 1 each (4 mg total) to the mouth or throat as needed for smoking cessation.  0    omeprazole (PRILOSEC) 20 MG capsule [OMEPRAZOLE (PRILOSEC) 20 MG CAPSULE] Take 20 mg by mouth daily before breakfast.      oxyCODONE (ROXICODONE) 5 MG immediate release tablet [OXYCODONE (ROXICODONE) 5 MG IMMEDIATE RELEASE TABLET] Take 1 tablet (5 mg total) by mouth every 6 (six) hours as needed for pain. 12 tablet 0    varenicline (CHANTIX) 1 mg tablet [VARENICLINE (CHANTIX) 1 MG TABLET] Take 1 tab by mouth two times a day. Take after eating with a full glass of water. NOTE: Dispense as maintenance for  "refills only. 60 tablet 2         ALLERGIES:  No Known Allergies    FAMILY HISTORY:  No family history on file.    SOCIAL HISTORY:   Social History     Socioeconomic History    Marital status: Single   Tobacco Use    Smoking status: Every Day     Current packs/day: 1.00     Types: Cigarettes    Smokeless tobacco: Never    Tobacco comments:     Wants to quit   Substance and Sexual Activity    Alcohol use: No    Drug use: No     Social Determinants of Health     Financial Resource Strain: Low Risk  (2/29/2024)    Received from [facility]    Financial Resource Strain     Difficulty of Paying Living Expenses: 3   Food Insecurity: No Food Insecurity (2/29/2024)    Received from [facility]    Food Insecurity     Worried About Running Out of Food in the Last Year: 1   Transportation Needs: No Transportation Needs (2/29/2024)    Received from [facility]    Transportation Needs     Lack of Transportation (Medical): 1   Social Connections: Socially Integrated (2/29/2024)    Received from [facility]    Social Connections     Frequency of Communication with Friends and Family: 0   Housing Stability: Low Risk  (2/29/2024)    [facility]    Housing Stability     Unable to Pay for Housing in the Last Year: 1       VITALS:  /88   Pulse 111   Temp 97.7  F (36.5  C) (Oral)   Resp 20   Ht 1.803 m (5' 11\")   Wt 83.9 kg (185 lb)   SpO2 98%   BMI 25.80 kg/m      PHYSICAL EXAM    Physical Exam  Constitutional:       General: He is not in acute distress.     Appearance: He is well-developed. He is not diaphoretic.   HENT:      Head: Normocephalic and atraumatic.      Nose: Nose normal. No congestion.      Mouth/Throat:      Mouth: Mucous membranes are moist.      Pharynx: Oropharynx is clear.      Comments: Large periapical abscess above tooth " #14.  Somewhat pointing.  Overall poor dentition.  No trismus.  Eyes:      General: No scleral icterus.     Extraocular Movements: Extraocular movements intact.      Conjunctiva/sclera: Conjunctivae normal.      Pupils: Pupils are equal, round, and reactive to light.   Cardiovascular:      Rate and Rhythm: Normal rate.   Pulmonary:      Effort: Pulmonary effort is normal.   Abdominal:      General: Abdomen is flat.   Musculoskeletal:         General: No tenderness or deformity. Normal range of motion.      Cervical back: Normal range of motion and neck supple.   Lymphadenopathy:      Cervical: No cervical adenopathy.   Skin:     General: Skin is warm and dry.      Capillary Refill: Capillary refill takes less than 2 seconds.      Findings: No rash.   Neurological:      General: No focal deficit present.      Mental Status: He is alert and oriented to person, place, and time.      Cranial Nerves: No cranial nerve deficit.      Sensory: No sensory deficit.      Coordination: Coordination normal.           LAB:  All pertinent labs reviewed and interpreted.  Labs Ordered and Resulted from Time of ED Arrival to Time of ED Departure - No data to display    RADIOLOGY:  Reviewed all pertinent imaging. Please see official radiology report.  No orders to display         Javid Quiñonez M.D.  Emergency Medicine  [facility] EMERGENCY ROOM  [address] [id number] [phone]  Dept: [phone]       Javid Quiñonez MD  08/26/24 4903

## 2024-08-26 NOTE — ED TRIAGE NOTES
Pt states approximately a week ago, the left upper gums developed a large lump on it and it is painful.  Pt denies taking anything for pain     Triage Assessment (Adult)       Row Name 08/26/24 0532          Triage Assessment    Airway WDL WDL        Respiratory WDL    Respiratory WDL WDL        Skin Circulation/Temperature WDL    Skin Circulation/Temperature WDL WDL        Cardiac WDL    Cardiac WDL WDL        Peripheral/Neurovascular WDL    Peripheral Neurovascular WDL WDL        Cognitive/Neuro/Behavioral WDL    Cognitive/Neuro/Behavioral WDL WDL